# Patient Record
Sex: FEMALE | NOT HISPANIC OR LATINO | URBAN - METROPOLITAN AREA
[De-identification: names, ages, dates, MRNs, and addresses within clinical notes are randomized per-mention and may not be internally consistent; named-entity substitution may affect disease eponyms.]

---

## 2017-11-01 ENCOUNTER — NEW PATIENT (OUTPATIENT)
Dept: URBAN - METROPOLITAN AREA CLINIC 30 | Facility: CLINIC | Age: 72
End: 2017-11-01
Payer: COMMERCIAL

## 2017-11-01 PROCEDURE — 76514 ECHO EXAM OF EYE THICKNESS: CPT | Performed by: OPTOMETRIST

## 2017-11-01 PROCEDURE — 83861 MICROFLUID ANALY TEARS: CPT | Performed by: OPTOMETRIST

## 2017-11-01 PROCEDURE — 92250 FUNDUS PHOTOGRAPHY W/I&R: CPT | Performed by: OPTOMETRIST

## 2017-11-01 PROCEDURE — 92004 COMPRE OPH EXAM NEW PT 1/>: CPT | Performed by: OPTOMETRIST

## 2017-11-01 ASSESSMENT — VISUAL ACUITY
OD: 20/60
OS: 20/30

## 2017-11-01 ASSESSMENT — INTRAOCULAR PRESSURE
OD: 14
OS: 14

## 2017-11-01 ASSESSMENT — KERATOMETRY
OD: 44.00
OS: 43.50

## 2017-12-12 ENCOUNTER — Encounter (OUTPATIENT)
Dept: URBAN - METROPOLITAN AREA CLINIC 30 | Facility: CLINIC | Age: 72
End: 2017-12-12
Payer: COMMERCIAL

## 2017-12-12 DIAGNOSIS — Z01.818 ENCOUNTER FOR OTHER PREPROCEDURAL EXAMINATION: Primary | ICD-10-CM

## 2017-12-12 DIAGNOSIS — H25.813 COMBINED FORMS OF AGE-RELATED CATARACT, BILATERAL: Primary | ICD-10-CM

## 2017-12-12 PROCEDURE — 99213 OFFICE O/P EST LOW 20 MIN: CPT | Performed by: NURSE PRACTITIONER

## 2017-12-12 RX ORDER — LOSARTAN POTASSIUM 100 MG/1
100 MG TABLET ORAL
Qty: 0 | Refills: 0 | Status: ACTIVE
Start: 2017-12-12

## 2017-12-12 RX ORDER — ALBUTEROL SULFATE 108 UG/1
AEROSOL, METERED RESPIRATORY (INHALATION)
Qty: 0 | Refills: 0 | Status: ACTIVE
Start: 2017-12-12

## 2017-12-12 RX ORDER — MULTIVITAMIN
TABLET ORAL
Qty: 0 | Refills: 0 | Status: ACTIVE
Start: 2017-12-12

## 2017-12-12 RX ORDER — LORATADINE 10 MG
10 MG TABLET ORAL
Qty: 0 | Refills: 0 | Status: INACTIVE
Start: 2017-12-12 | End: 2019-04-22

## 2017-12-12 RX ORDER — CLOBETASOL PROPIONATE 0.5 MG/G
0.05 % CREAM TOPICAL
Qty: 0 | Refills: 0 | Status: ACTIVE
Start: 2017-12-12

## 2017-12-12 RX ORDER — OMEGA-3 FATTY ACIDS CAP 1000 MG 1000 MG
CAP ORAL
Qty: 0 | Refills: 0 | Status: ACTIVE
Start: 2017-12-12

## 2017-12-12 RX ORDER — TACROLIMUS 1 MG/G
0.1 % OINTMENT TOPICAL
Qty: 0 | Refills: 0 | Status: ACTIVE
Start: 2017-12-12

## 2017-12-13 ENCOUNTER — FOLLOW UP ESTABLISHED (OUTPATIENT)
Dept: URBAN - METROPOLITAN AREA CLINIC 24 | Facility: CLINIC | Age: 72
End: 2017-12-13
Payer: COMMERCIAL

## 2017-12-13 DIAGNOSIS — H35.363 DRUSEN (DEGENERATIVE) OF MACULA, BILATERAL: ICD-10-CM

## 2017-12-13 DIAGNOSIS — H25.812 COMBINED FORMS OF AGE-RELATED CATARACT, LEFT EYE: ICD-10-CM

## 2017-12-13 DIAGNOSIS — H25.811 COMBINED FORMS OF AGE-RELATED CATARACT, RIGHT EYE: ICD-10-CM

## 2017-12-13 DIAGNOSIS — H40.033 ANATOMICAL NARROW ANGLE, BILATERAL: ICD-10-CM

## 2017-12-13 PROCEDURE — 92012 INTRM OPH EXAM EST PATIENT: CPT | Performed by: OPHTHALMOLOGY

## 2017-12-13 ASSESSMENT — INTRAOCULAR PRESSURE
OS: 12
OD: 12

## 2017-12-20 ENCOUNTER — SURGERY (OUTPATIENT)
Dept: URBAN - METROPOLITAN AREA SURGERY 12 | Facility: SURGERY | Age: 72
End: 2017-12-20
Payer: COMMERCIAL

## 2017-12-20 PROCEDURE — 66984 XCAPSL CTRC RMVL W/O ECP: CPT | Performed by: OPHTHALMOLOGY

## 2017-12-21 ENCOUNTER — POST OP (OUTPATIENT)
Dept: URBAN - METROPOLITAN AREA CLINIC 30 | Facility: CLINIC | Age: 72
End: 2017-12-21

## 2017-12-21 PROCEDURE — 99024 POSTOP FOLLOW-UP VISIT: CPT | Performed by: OPTOMETRIST

## 2017-12-21 ASSESSMENT — INTRAOCULAR PRESSURE: OD: 14

## 2017-12-27 ENCOUNTER — POST OP (OUTPATIENT)
Dept: URBAN - METROPOLITAN AREA CLINIC 30 | Facility: CLINIC | Age: 72
End: 2017-12-27

## 2017-12-27 PROCEDURE — 99024 POSTOP FOLLOW-UP VISIT: CPT | Performed by: OPTOMETRIST

## 2017-12-27 ASSESSMENT — INTRAOCULAR PRESSURE
OD: 16
OS: 13

## 2017-12-27 ASSESSMENT — VISUAL ACUITY
OS: 20/40
OD: 20/25

## 2018-01-10 ENCOUNTER — SURGERY (OUTPATIENT)
Dept: URBAN - METROPOLITAN AREA SURGERY 12 | Facility: SURGERY | Age: 73
End: 2018-01-10
Payer: COMMERCIAL

## 2018-01-10 PROCEDURE — 66984 XCAPSL CTRC RMVL W/O ECP: CPT | Performed by: OPHTHALMOLOGY

## 2018-01-11 ENCOUNTER — POST OP (OUTPATIENT)
Dept: URBAN - METROPOLITAN AREA CLINIC 30 | Facility: CLINIC | Age: 73
End: 2018-01-11

## 2018-01-11 PROCEDURE — 99024 POSTOP FOLLOW-UP VISIT: CPT | Performed by: OPTOMETRIST

## 2018-01-11 ASSESSMENT — INTRAOCULAR PRESSURE
OD: 16
OS: 15

## 2018-03-27 ENCOUNTER — POST OP (OUTPATIENT)
Dept: URBAN - METROPOLITAN AREA CLINIC 30 | Facility: CLINIC | Age: 73
End: 2018-03-27

## 2018-03-27 PROCEDURE — 99024 POSTOP FOLLOW-UP VISIT: CPT | Performed by: OPTOMETRIST

## 2018-03-27 ASSESSMENT — VISUAL ACUITY
OD: 20/20
OS: 20/20

## 2018-03-27 ASSESSMENT — INTRAOCULAR PRESSURE
OD: 17
OS: 16

## 2019-04-22 ENCOUNTER — FOLLOW UP ESTABLISHED (OUTPATIENT)
Dept: URBAN - METROPOLITAN AREA CLINIC 30 | Facility: CLINIC | Age: 74
End: 2019-04-22
Payer: COMMERCIAL

## 2019-04-22 DIAGNOSIS — Z96.1 PRESENCE OF INTRAOCULAR LENS: ICD-10-CM

## 2019-04-22 DIAGNOSIS — H26.493 OTHER SECONDARY CATARACT, BILATERAL: ICD-10-CM

## 2019-04-22 DIAGNOSIS — E11.9 TYPE 2 DIABETES MELLITUS WITHOUT COMPLICATIONS: Primary | ICD-10-CM

## 2019-04-22 PROCEDURE — 92134 CPTRZ OPH DX IMG PST SGM RTA: CPT | Performed by: OPTOMETRIST

## 2019-04-22 PROCEDURE — 92014 COMPRE OPH EXAM EST PT 1/>: CPT | Performed by: OPTOMETRIST

## 2019-04-22 ASSESSMENT — VISUAL ACUITY
OS: 20/20
OD: 20/20

## 2019-04-22 ASSESSMENT — INTRAOCULAR PRESSURE
OS: 14
OD: 13

## 2019-04-22 ASSESSMENT — KERATOMETRY
OS: 44.35
OD: 44.55

## 2020-07-16 ENCOUNTER — FOLLOW UP ESTABLISHED (OUTPATIENT)
Dept: URBAN - METROPOLITAN AREA CLINIC 30 | Facility: CLINIC | Age: 75
End: 2020-07-16
Payer: MEDICARE

## 2020-07-16 DIAGNOSIS — H35.3111 NONEXUDATIVE MACULAR DEGENERATION, EARLY DRY STAGE, RIGHT EYE: ICD-10-CM

## 2020-07-16 DIAGNOSIS — H35.3221 EXUDATIVE MACULAR DEGENERATION, WITH ACTIVE CHOROIDAL NEOVASCULARIZATION, LEFT EYE: ICD-10-CM

## 2020-07-16 DIAGNOSIS — H04.123 DRY EYE SYNDROME OF BILATERAL LACRIMAL GLANDS: ICD-10-CM

## 2020-07-16 PROCEDURE — 92134 CPTRZ OPH DX IMG PST SGM RTA: CPT | Performed by: OPTOMETRIST

## 2020-07-16 PROCEDURE — 92014 COMPRE OPH EXAM EST PT 1/>: CPT | Performed by: OPTOMETRIST

## 2020-07-16 ASSESSMENT — INTRAOCULAR PRESSURE
OS: 15
OD: 15

## 2020-07-16 ASSESSMENT — VISUAL ACUITY
OD: 20/30
OS: 20/30

## 2020-07-16 ASSESSMENT — KERATOMETRY
OD: 44.68
OS: 44.35

## 2020-07-23 ENCOUNTER — FOLLOW UP ESTABLISHED (OUTPATIENT)
Dept: URBAN - METROPOLITAN AREA CLINIC 30 | Facility: CLINIC | Age: 75
End: 2020-07-23
Payer: MEDICARE

## 2020-07-23 PROCEDURE — 67028 INJECTION EYE DRUG: CPT | Performed by: OPHTHALMOLOGY

## 2020-07-23 PROCEDURE — 92014 COMPRE OPH EXAM EST PT 1/>: CPT | Performed by: OPHTHALMOLOGY

## 2020-07-23 PROCEDURE — 92134 CPTRZ OPH DX IMG PST SGM RTA: CPT | Performed by: OPHTHALMOLOGY

## 2020-07-23 PROCEDURE — 92242 FLUORESCEIN&ICG ANGIOGRAPHY: CPT | Performed by: OPHTHALMOLOGY

## 2020-07-23 ASSESSMENT — INTRAOCULAR PRESSURE
OS: 12
OD: 13

## 2020-08-20 ENCOUNTER — FOLLOW UP ESTABLISHED (OUTPATIENT)
Dept: URBAN - METROPOLITAN AREA CLINIC 30 | Facility: CLINIC | Age: 75
End: 2020-08-20
Payer: MEDICARE

## 2020-08-20 PROCEDURE — 67028 INJECTION EYE DRUG: CPT | Performed by: OPHTHALMOLOGY

## 2020-08-20 PROCEDURE — 92134 CPTRZ OPH DX IMG PST SGM RTA: CPT | Performed by: OPHTHALMOLOGY

## 2020-08-20 ASSESSMENT — INTRAOCULAR PRESSURE
OS: 17
OD: 15

## 2020-09-18 ENCOUNTER — FOLLOW UP ESTABLISHED (OUTPATIENT)
Dept: URBAN - METROPOLITAN AREA CLINIC 30 | Facility: CLINIC | Age: 75
End: 2020-09-18
Payer: MEDICARE

## 2020-09-18 PROCEDURE — 92134 CPTRZ OPH DX IMG PST SGM RTA: CPT | Performed by: OPHTHALMOLOGY

## 2020-09-18 PROCEDURE — 67028 INJECTION EYE DRUG: CPT | Performed by: OPHTHALMOLOGY

## 2020-09-18 ASSESSMENT — INTRAOCULAR PRESSURE
OD: 16
OS: 15

## 2020-10-15 ENCOUNTER — FOLLOW UP ESTABLISHED (OUTPATIENT)
Dept: URBAN - METROPOLITAN AREA CLINIC 30 | Facility: CLINIC | Age: 75
End: 2020-10-15
Payer: MEDICARE

## 2020-10-15 DIAGNOSIS — Z79.84 LONG TERM (CURRENT) USE OF ORAL HYPOGLYCEMIC DRUGS: ICD-10-CM

## 2020-10-15 PROCEDURE — 92014 COMPRE OPH EXAM EST PT 1/>: CPT | Performed by: OPHTHALMOLOGY

## 2020-10-15 PROCEDURE — 67028 INJECTION EYE DRUG: CPT | Performed by: OPHTHALMOLOGY

## 2020-10-15 PROCEDURE — 92134 CPTRZ OPH DX IMG PST SGM RTA: CPT | Performed by: OPHTHALMOLOGY

## 2020-10-15 ASSESSMENT — INTRAOCULAR PRESSURE
OS: 12
OD: 17

## 2020-12-10 ENCOUNTER — FOLLOW UP ESTABLISHED (OUTPATIENT)
Dept: URBAN - METROPOLITAN AREA CLINIC 30 | Facility: CLINIC | Age: 75
End: 2020-12-10
Payer: MEDICARE

## 2020-12-10 PROCEDURE — 92134 CPTRZ OPH DX IMG PST SGM RTA: CPT | Performed by: OPHTHALMOLOGY

## 2020-12-10 PROCEDURE — 67028 INJECTION EYE DRUG: CPT | Performed by: OPHTHALMOLOGY

## 2020-12-10 ASSESSMENT — INTRAOCULAR PRESSURE
OD: 17
OS: 15

## 2021-04-15 ENCOUNTER — OFFICE VISIT (OUTPATIENT)
Dept: URBAN - METROPOLITAN AREA CLINIC 30 | Facility: CLINIC | Age: 76
End: 2021-04-15
Payer: MEDICARE

## 2021-04-15 PROCEDURE — 92014 COMPRE OPH EXAM EST PT 1/>: CPT | Performed by: OPHTHALMOLOGY

## 2021-04-15 PROCEDURE — 92134 CPTRZ OPH DX IMG PST SGM RTA: CPT | Performed by: OPHTHALMOLOGY

## 2021-04-15 NOTE — IMPRESSION/PLAN
Impression: Exudative macular degeneration, with active choroidal neovascularization, left eye Plan: Inactive CNVM OS - fluid resolved. Last avastin injection December 2020. Stable. Observe and review in 8 weeks, sooner PRN. CNVM precautions emphasized.  Amsler grid given

## 2021-06-10 ENCOUNTER — OFFICE VISIT (OUTPATIENT)
Dept: URBAN - METROPOLITAN AREA CLINIC 30 | Facility: CLINIC | Age: 76
End: 2021-06-10
Payer: MEDICARE

## 2021-06-10 DIAGNOSIS — H35.3112 NONEXUDATIVE AGE-RELATED MACULAR DEGENERATION, RIGHT EYE, INTERMEDIATE DRY STAGE: ICD-10-CM

## 2021-06-10 PROCEDURE — 92014 COMPRE OPH EXAM EST PT 1/>: CPT | Performed by: OPHTHALMOLOGY

## 2021-06-10 PROCEDURE — 92134 CPTRZ OPH DX IMG PST SGM RTA: CPT | Performed by: OPHTHALMOLOGY

## 2021-06-10 ASSESSMENT — INTRAOCULAR PRESSURE
OD: 18
OS: 19

## 2021-06-10 NOTE — IMPRESSION/PLAN
Impression: Exudative macular degeneration, with active choroidal neovascularization, left eye Plan: Inactive CNVM OS - fluid resolved. Last avastin injection December 2020. Stable. Observe and review in 4 months, sooner PRN. CNVM precautions emphasized.  Amsler grid given

## 2021-06-10 NOTE — IMPRESSION/PLAN
Impression: Nonexudative macular degeneration, intermediate dry stage, right eye Plan: No evidence of CNVM on imaging or examination. Will observe for now. Return precautions emphasized. If any distortions or changes in vision, to call immediately. 

Smoking cessation (not a smoker), AREDS2 vitamins, sunglasses on bright days

## 2021-11-11 ENCOUNTER — OFFICE VISIT (OUTPATIENT)
Dept: URBAN - METROPOLITAN AREA CLINIC 30 | Facility: CLINIC | Age: 76
End: 2021-11-11
Payer: MEDICARE

## 2021-11-11 PROCEDURE — 92134 CPTRZ OPH DX IMG PST SGM RTA: CPT | Performed by: OPHTHALMOLOGY

## 2021-11-11 PROCEDURE — 92014 COMPRE OPH EXAM EST PT 1/>: CPT | Performed by: OPHTHALMOLOGY

## 2021-11-11 ASSESSMENT — INTRAOCULAR PRESSURE
OD: 13
OS: 15

## 2021-11-11 NOTE — IMPRESSION/PLAN
Impression: Exudative macular degeneration, with active choroidal neovascularization, left eye Plan: Inactive CNVM OS - fluid resolved. Last avastin injection December 2020. Stable. Observe and review in 6 months, sooner PRN. CNVM precautions emphasized.  Amsler grid given